# Patient Record
Sex: MALE | Race: BLACK OR AFRICAN AMERICAN | NOT HISPANIC OR LATINO | URBAN - METROPOLITAN AREA
[De-identification: names, ages, dates, MRNs, and addresses within clinical notes are randomized per-mention and may not be internally consistent; named-entity substitution may affect disease eponyms.]

---

## 2018-03-01 ENCOUNTER — OFFICE VISIT CONVERTED (OUTPATIENT)
Dept: FAMILY MEDICINE CLINIC | Facility: CLINIC | Age: 63
End: 2018-03-01
Attending: FAMILY MEDICINE

## 2018-05-24 ENCOUNTER — CONVERSION ENCOUNTER (OUTPATIENT)
Dept: FAMILY MEDICINE CLINIC | Facility: CLINIC | Age: 63
End: 2018-05-24

## 2018-05-24 ENCOUNTER — OFFICE VISIT CONVERTED (OUTPATIENT)
Dept: FAMILY MEDICINE CLINIC | Facility: CLINIC | Age: 63
End: 2018-05-24
Attending: NURSE PRACTITIONER

## 2018-05-29 ENCOUNTER — CONVERSION ENCOUNTER (OUTPATIENT)
Dept: UROLOGY | Facility: CLINIC | Age: 63
End: 2018-05-29

## 2018-05-29 ENCOUNTER — OFFICE VISIT CONVERTED (OUTPATIENT)
Dept: UROLOGY | Facility: CLINIC | Age: 63
End: 2018-05-29
Attending: NURSE PRACTITIONER

## 2018-06-28 ENCOUNTER — PROCEDURE VISIT CONVERTED (OUTPATIENT)
Dept: UROLOGY | Facility: CLINIC | Age: 63
End: 2018-06-28
Attending: UROLOGY

## 2018-08-01 ENCOUNTER — OFFICE VISIT CONVERTED (OUTPATIENT)
Dept: UROLOGY | Facility: CLINIC | Age: 63
End: 2018-08-01
Attending: NURSE PRACTITIONER

## 2018-08-20 ENCOUNTER — OFFICE VISIT CONVERTED (OUTPATIENT)
Dept: FAMILY MEDICINE CLINIC | Facility: CLINIC | Age: 63
End: 2018-08-20
Attending: NURSE PRACTITIONER

## 2018-09-06 ENCOUNTER — CONVERSION ENCOUNTER (OUTPATIENT)
Dept: UROLOGY | Facility: CLINIC | Age: 63
End: 2018-09-06

## 2018-09-06 ENCOUNTER — OFFICE VISIT CONVERTED (OUTPATIENT)
Dept: UROLOGY | Facility: CLINIC | Age: 63
End: 2018-09-06
Attending: UROLOGY

## 2018-12-18 ENCOUNTER — OFFICE VISIT CONVERTED (OUTPATIENT)
Dept: FAMILY MEDICINE CLINIC | Facility: CLINIC | Age: 63
End: 2018-12-18
Attending: NURSE PRACTITIONER

## 2019-02-04 ENCOUNTER — HOSPITAL ENCOUNTER (OUTPATIENT)
Dept: FAMILY MEDICINE CLINIC | Facility: CLINIC | Age: 64
Discharge: HOME | End: 2019-02-04
Attending: NURSE PRACTITIONER

## 2019-02-04 ENCOUNTER — OFFICE VISIT CONVERTED (OUTPATIENT)
Dept: FAMILY MEDICINE CLINIC | Facility: CLINIC | Age: 64
End: 2019-02-04
Attending: NURSE PRACTITIONER

## 2019-02-04 LAB
25(OH)D3 SERPL-MCNC: 16.6 NG/ML (ref 30–100)
ALBUMIN SERPL-MCNC: 4.3 G/DL (ref 3.5–5)
ALBUMIN/GLOB SERPL: 1.3 {RATIO} (ref 1.4–2.6)
ALP SERPL-CCNC: 83 U/L (ref 56–155)
ALT SERPL-CCNC: 10 U/L (ref 10–40)
ANION GAP SERPL CALC-SCNC: 19 MMOL/L (ref 8–19)
AST SERPL-CCNC: 17 U/L (ref 15–50)
BASOPHILS # BLD AUTO: 0 10*3/UL (ref 0–0.2)
BASOPHILS NFR BLD AUTO: 0.1 % (ref 0–3)
BILIRUB SERPL-MCNC: 0.56 MG/DL (ref 0.2–1.3)
BUN SERPL-MCNC: 14 MG/DL (ref 5–25)
BUN/CREAT SERPL: 12 {RATIO} (ref 6–20)
CALCIUM SERPL-MCNC: 9.2 MG/DL (ref 8.7–10.4)
CHLORIDE SERPL-SCNC: 98 MMOL/L (ref 99–111)
CHOLEST SERPL-MCNC: 173 MG/DL (ref 107–200)
CHOLEST/HDLC SERPL: 4.2 {RATIO} (ref 3–6)
CONV CO2: 27 MMOL/L (ref 22–32)
CONV CREATININE URINE, RANDOM: 65.8 MG/DL (ref 10–300)
CONV MICROALBUM.,U,RANDOM: <12 MG/L (ref 0–20)
CONV TOTAL PROTEIN: 7.5 G/DL (ref 6.3–8.2)
CREAT UR-MCNC: 1.18 MG/DL (ref 0.7–1.2)
EOSINOPHIL # BLD AUTO: 0.14 10*3/UL (ref 0–0.7)
EOSINOPHIL # BLD AUTO: 3.93 % (ref 0–7)
ERYTHROCYTE [DISTWIDTH] IN BLOOD BY AUTOMATED COUNT: 13.4 % (ref 11.5–14.5)
EST. AVERAGE GLUCOSE BLD GHB EST-MCNC: 128 MG/DL
FERRITIN SERPL-MCNC: 197 NG/ML (ref 30–300)
FOLATE SERPL-MCNC: 6.9 NG/ML (ref 4.8–20)
GFR SERPLBLD BASED ON 1.73 SQ M-ARVRAT: >60 ML/MIN/{1.73_M2}
GLOBULIN UR ELPH-MCNC: 3.2 G/DL (ref 2–3.5)
GLUCOSE SERPL-MCNC: 97 MG/DL (ref 70–99)
HBA1C MFR BLD: 13.6 G/DL (ref 14–18)
HBA1C MFR BLD: 6.1 % (ref 3.5–5.7)
HCT VFR BLD AUTO: 37.7 % (ref 42–52)
HDLC SERPL-MCNC: 41 MG/DL (ref 40–60)
IRON SATN MFR SERPL: 23 % (ref 20–55)
IRON SERPL-MCNC: 77 UG/DL (ref 70–180)
LDLC SERPL CALC-MCNC: 115 MG/DL (ref 70–100)
LYMPHOCYTES # BLD AUTO: 1.25 10*3/UL (ref 1–5)
MCH RBC QN AUTO: 30.1 PG (ref 27–31)
MCHC RBC AUTO-ENTMCNC: 36.1 G/DL (ref 33–37)
MCV RBC AUTO: 83.5 FL (ref 80–96)
MICROALBUMIN/CREAT UR: 18.2 MG/G{CRE} (ref 0–25)
MONOCYTES # BLD AUTO: 0.23 10*3/UL (ref 0.2–1.2)
MONOCYTES NFR BLD AUTO: 6.39 % (ref 3–10)
NEUTROPHILS # BLD AUTO: 1.96 10*3/UL (ref 2–8)
NEUTROPHILS NFR BLD AUTO: 54.7 % (ref 30–85)
NRBC BLD AUTO-RTO: 0 % (ref 0–0.01)
OSMOLALITY SERPL CALC.SUM OF ELEC: 292 MOSM/KG (ref 273–304)
PLATELET # BLD AUTO: 215 10*3/UL (ref 130–400)
PMV BLD AUTO: 9.5 FL (ref 7.4–10.4)
POTASSIUM SERPL-SCNC: 2.8 MMOL/L (ref 3.5–5.3)
RBC # BLD AUTO: 4.52 10*6/UL (ref 4.7–6.1)
SODIUM SERPL-SCNC: 141 MMOL/L (ref 135–147)
T4 FREE SERPL-MCNC: 1.7 NG/DL (ref 0.9–1.8)
TIBC SERPL-MCNC: 336 UG/DL (ref 245–450)
TRANSFERRIN SERPL-MCNC: 235 MG/DL (ref 215–365)
TRIGL SERPL-MCNC: 83 MG/DL (ref 40–150)
TSH SERPL-ACNC: 0.56 M[IU]/L (ref 0.27–4.2)
URATE SERPL-MCNC: 6.4 MG/DL (ref 3.5–8.5)
VARIANT LYMPHS NFR BLD MANUAL: 34.9 % (ref 20–45)
VIT B12 SERPL-MCNC: 465 PG/ML (ref 211–911)
VLDLC SERPL-MCNC: 17 MG/DL (ref 5–37)
WBC # BLD AUTO: 3.57 10*3/UL (ref 4.8–10.8)

## 2019-02-11 ENCOUNTER — HOSPITAL ENCOUNTER (OUTPATIENT)
Dept: FAMILY MEDICINE CLINIC | Facility: CLINIC | Age: 64
Discharge: HOME | End: 2019-02-11
Attending: NURSE PRACTITIONER

## 2019-02-11 LAB
ALBUMIN SERPL-MCNC: 4.3 G/DL (ref 3.5–5)
ALBUMIN/GLOB SERPL: 1.3 {RATIO} (ref 1.4–2.6)
ALP SERPL-CCNC: 79 U/L (ref 56–155)
ALT SERPL-CCNC: 9 U/L (ref 10–40)
ANION GAP SERPL CALC-SCNC: 19 MMOL/L (ref 8–19)
AST SERPL-CCNC: 16 U/L (ref 15–50)
BILIRUB SERPL-MCNC: 0.38 MG/DL (ref 0.2–1.3)
BUN SERPL-MCNC: 15 MG/DL (ref 5–25)
BUN/CREAT SERPL: 12 {RATIO} (ref 6–20)
CALCIUM SERPL-MCNC: 9.5 MG/DL (ref 8.7–10.4)
CHLORIDE SERPL-SCNC: 105 MMOL/L (ref 99–111)
CONV CO2: 27 MMOL/L (ref 22–32)
CONV TOTAL PROTEIN: 7.6 G/DL (ref 6.3–8.2)
CREAT UR-MCNC: 1.22 MG/DL (ref 0.7–1.2)
GFR SERPLBLD BASED ON 1.73 SQ M-ARVRAT: >60 ML/MIN/{1.73_M2}
GLOBULIN UR ELPH-MCNC: 3.3 G/DL (ref 2–3.5)
GLUCOSE SERPL-MCNC: 111 MG/DL (ref 70–99)
OSMOLALITY SERPL CALC.SUM OF ELEC: 306 MOSM/KG (ref 273–304)
POTASSIUM SERPL-SCNC: 4 MMOL/L (ref 3.5–5.3)
SODIUM SERPL-SCNC: 147 MMOL/L (ref 135–147)

## 2019-02-12 ENCOUNTER — HOSPITAL ENCOUNTER (OUTPATIENT)
Dept: UROLOGY | Facility: CLINIC | Age: 64
Discharge: HOME | End: 2019-02-12
Attending: UROLOGY

## 2019-02-12 ENCOUNTER — OFFICE VISIT CONVERTED (OUTPATIENT)
Dept: UROLOGY | Facility: CLINIC | Age: 64
End: 2019-02-12
Attending: UROLOGY

## 2019-02-12 ENCOUNTER — CONVERSION ENCOUNTER (OUTPATIENT)
Dept: UROLOGY | Facility: CLINIC | Age: 64
End: 2019-02-12

## 2019-02-12 LAB — PSA SERPL-MCNC: 1.56 NG/ML (ref 0–4)

## 2019-02-25 ENCOUNTER — OFFICE VISIT CONVERTED (OUTPATIENT)
Dept: FAMILY MEDICINE CLINIC | Facility: CLINIC | Age: 64
End: 2019-02-25
Attending: NURSE PRACTITIONER

## 2019-03-13 ENCOUNTER — OFFICE VISIT CONVERTED (OUTPATIENT)
Dept: FAMILY MEDICINE CLINIC | Facility: CLINIC | Age: 64
End: 2019-03-13
Attending: NURSE PRACTITIONER

## 2019-03-26 ENCOUNTER — OFFICE VISIT CONVERTED (OUTPATIENT)
Dept: FAMILY MEDICINE CLINIC | Facility: CLINIC | Age: 64
End: 2019-03-26
Attending: NURSE PRACTITIONER

## 2019-04-01 ENCOUNTER — HOSPITAL ENCOUNTER (OUTPATIENT)
Dept: OTHER | Facility: HOSPITAL | Age: 64
Discharge: HOME | End: 2019-04-01
Attending: NURSE PRACTITIONER

## 2019-12-18 ENCOUNTER — OFFICE VISIT CONVERTED (OUTPATIENT)
Dept: NEUROSURGERY | Facility: CLINIC | Age: 64
End: 2019-12-18
Attending: NEUROLOGICAL SURGERY

## 2020-04-02 ENCOUNTER — TELEMEDICINE CONVERTED (OUTPATIENT)
Dept: FAMILY MEDICINE CLINIC | Facility: CLINIC | Age: 65
End: 2020-04-02
Attending: NURSE PRACTITIONER

## 2021-05-07 NOTE — PROGRESS NOTES
"   Progress Note      Patient Name: Rambo Koehler   Patient ID: [id number]   Sex: Male   YOB: 1955        Visit Date: December 18, 2018    Provider: JOSELINE Montemayor   Location: [facility]   Location Address: [address] [place] [address]  [id number]   Location Phone: [phone]          Chief Complaint     he hurt 2 toes on his left foot, the toes are swollen and not healing, happened about 1 week ago       History Of Present Illness  Rambo Koehler is a 63 year old /Black male who presents for evaluation and treatment of:      injury to the 4th and 5th left toes x 1 week ago - he stubbed his toes on a chair in the floor - pt states it is turning dark - admits to pain when \"he can feel it\" he goes to Dr. Hawthorne for his diabetic neuropathy - bruising, swelling and redness       Past Medical History  Disease Name Date Onset Notes   Diet-controlled diabetes mellitus --  --    GERD (gastroesophageal reflux disease) --  --    Hypertension --  --    Hypothyroidism --  --    Neuropathy, peripheral --  --    Prostate Disorder --  --          Past Surgical History  Procedure Name Date Notes   Cataract surgery --  --          Medication List  Name Date Started Instructions   Accu-Chek Nedra Plus Meter miscellaneous misc 08/20/2018 use as directed for 90 days check daily   Accu-Chek Nedra Plus test strp miscellaneous strip 08/20/2018 use as directed check glucose daily   Accu-Chek Softclix Lancets miscellaneous misc 08/20/2018 use as directed check glucose daily   amlodipine 2.5 mg oral tablet 12/11/2018 TAKE ONE TABLET BY MOUTH DAILY   atenolol 25 mg oral tablet 11/05/2018 TAKE ONE TABLET BY MOUTH TWICE A DAY FOR HIGH BLOOD PRESSURE   baclofen 10 mg oral tablet 12/14/2018 TAKE ONE TABLET BY MOUTH DAILY AS NEEDED FOR MUSCLE SPASMS   Daily Multi-Vitamins/Iron oral tablet  take 1 tablet by oral route daily   ferrous sulfate 325 mg (65 mg iron) oral tablet  take 1 tablet " "(325 mg) by oral route once daily   folic acid 1 mg oral tablet 05/24/2018 take 1 tablet (1 mg) by oral route once daily   furosemide 20 mg oral tablet 12/11/2018 TAKE ONE TABLET BY MOUTH DAILY   gabapentin 800 mg oral tablet  take 1 tablet (800 mg) by oral route 3 times per day   hydrochlorothiazide 25 mg oral tablet 05/24/2018 TAKE ONE TABLET BY MOUTH DAILY FOR 30 DAYS   levothyroxine 175 mcg oral tablet 11/15/2018 TAKE ONE TABLET BY MOUTH DAILY   omeprazole 20 mg oral capsule,delayed release(DR/EC) 12/14/2018 TAKE ONE CAPSULE BY MOUTH DAILY 30 MINUTES TO 1 HOUR BEFORE A MEAL   tamsulosin 0.4 mg oral capsule,extended release 24hr 05/24/2018 take 1 capsule (0.4 mg) by oral route once daily 1/2 hour following the same meal each day         Allergy List  Allergen Name Date Reaction Notes   PENICILLINS --  --  --          Social History  Finding Status Start/Stop Quantity Notes   Tobacco Current every day --/-- 5-6 cigs a day --    Uses seatbelts --  --/-- --  yes         Review of Systems  · Cardiovascular  o Denies  o : chest pain, palpitations  · Respiratory  o Denies  o : shortness of breath  · Musculoskeletal  o * See HPI  · Endocrine  o Admits  o : diabetes      Vitals  Date Time BP Position Site L\R Cuff Size HR RR TEMP(F) WT  HT  BMI kg/m2 BSA m2 O2 Sat        12/18/2018 04:23 /70 Sitting    80 - R  97.6 254lbs 0oz 5'  11\" 35.43 2.4 98 %           Physical Examination  · Constitutional  o Appearance  o : well developed, well-nourished, in no acute distress  · Eyes  o Conjunctivae  o : conjunctivae normal  o Pupils and Irises  o : pupils equal and round, pupils reactive to light bilaterally  · Neck  o Inspection/Palpation  o : supple  o Thyroid  o : no thyromegaly  · Respiratory  o Respiratory Effort  o : breathing unlabored  o Auscultation of Lungs  o : clear to ascultation  · Cardiovascular  o Heart  o :   § Auscultation of Heart  § : regular rate and rhythm  o Peripheral Vascular System  o : "   § Extremities  § : no edema  · Lymphatic  o Neck  o : no lymphadenopathy present  · Musculoskeletal  o General  o :   § General Musculoskeletal  § : Left 4th toe with medial bruising and a medial whitish laceration. Pt denies pain with palpation but states cannot feel due to neuropathy. Muscle tone, strength, and development grossly normal.  · Skin and Subcutaneous Tissue  o General Inspection  o : see MS exam  · Neurologic  o Gait and Station  o :   § Gait Screening  § : gait with slightly limp  · Psychiatric  o Mood and Affect  o : mood normal, affect appropriate              Assessment  · Toe injury, left, initial encounter     959.7/S99.922A      Plan  · Orders  o ACO-17: Screened for tobacco use AND received tobacco cessation intervention (4004F) - - 12/18/2018  o ACO-39: Current medications updated and reviewed () - - 12/18/2018  o Foot (Left) [facility] ([id number]) - 959.7/S99.922A - 12/18/2018   No acute findings  · Medications  o Bactrim -160 mg oral tablet   SIG: take 1 tablet by oral route every 12 hours for 10 days   DISP: (20) tablets with 0 refills  Prescribed on 12/18/2018     o Bactroban 2 % topical cream   SIG: apply a small amount to the affected area by topical route 3 times per day for 7 days   DISP: (1) 15 gm tube with 0 refills  Prescribed on 12/18/2018     · Instructions  o Take all medications as prescribed/directed.  o Patient was educated/instructed on their diagnosis, treatment and medications prior to discharge from the clinic today.  o Follow up with any signs of infection.   · Disposition  o Follow up as needed.            Electronically Signed by: JOSELINE Montemayor -Author on December 18, 2018 05:14:14 PM

## 2021-05-07 NOTE — PROGRESS NOTES
Quick Note      Patient Name: Rambo Koehler   Patient ID: [id number]   Sex: Male   YOB: 1955    Referring Provider: Zakiya TREVIZO    Visit Date: April 2, 2020    Provider: JOSELINE Cardenas   Location: [facility]   Location Address: [address] [place] [address]   [place]  [id number]   Location Phone: [phone]          History Of Present Illness  TELEHEALTH VISIT  Chief Complaint: update on the pt and possible refills   Rambo Koehler is a 64 year old /Black male who is presenting for evaluation via telephone telehealth visit. Verbal consent obtained before beginning visit.   Provider spent 16 minutes with the patient during telephone telehealth visit.   The following staff were present during this visit: myself and wife of pt --who is the POA   Past Medical History/Overview of Patient Symptoms     attempted to call at 445pm, 447pm, 450pm, 455pm;   then the wife called back     start: 457 pm   ended:513pm     pt was in the hospital in November 2019 with a stroke--1 week prior to thanksgiving--pt had a blood clot to the brain--then bled from the inside and they did surgery--and had 58 staples--and then had to do rehab then was sent home just prior to new years--and pt had not taken any meds in months and missed all kinds of PCP appoints and then even was in the ICU they used ice to cool him off-tp get the temp down--    then did some PT at home then quit--    now he's walking some--uses a cane    she also reports he does not eat only lays around and drinks--and then walks some-and constantly wants to know what happened to his head-    then one time she came home and he'd turned on the stove (gas) to light a cigarette and left it on and she found it like that once home form work--and scared her terribly--the children stay next door while she is at work--but she reports his memory is getting worse and worse--and just unsure if she's going to have to  place him in a nsg home--she just doesn't know what to do--and he needs refills on everything he was DC to home on-as well--    ever since then had memory issues--and is drinking ETOH again --even though he knows not to--and memory getting worse     pt was on the meds they DC him home to but since ran out- none--and wife reports she's very concerned--and she reports his memory is getting far worse--    ___________________________________________________________________________________________    pt's last appoint her was last year 3/13/2019    and last labs here was in 02/2019--    pt with known alcoholism Hx                Assessment  · Alcohol abuse     305.00/F10.10  · Essential hypertension     401.9/I10  · Hyperlipidemia     272.4/E78.5  · Hypothyroidism     244.9/E03.9  · Memory changes     780.93/R41.3  · History of stroke     V12.54/Z86.73  · History of recent intracranial surgery     V45.89/Z98.890      Plan  · Orders  o NEUROLOGY CONSULTATION. (NEURO) - 780.93/R41.3, V12.54/Z86.73, V45.89/Z98.890 - 04/02/2020   pt suffered intracranial bleed and stroke in Nov 2019 and then DC from [facility] Dec 2019 and we just had first telehealth visit today and wife reports worsening memory changes   · Medications  o hydralazine 50 mg oral tablet   SIG: take 1 tablet by oral route 3 times a day for 90 days   DISP: (270) tablets with 0 refills  Adjusted on 04/02/2020     o carvedilol 12.5 mg oral tablet   SIG: take 1 tablet (12.5 mg) by oral route 2 times per day with food for 90 days   DISP: (180) tablets with 0 refills  Adjusted on 04/02/2020     o pantoprazole 40 mg oral tablet,delayed release (DR/EC)   SIG: take 1 tablet by oral route once a day (at bedtime) for 90 days   DISP: (90) tablets with 0 refills  Adjusted on 04/02/2020     o amlodipine 10 mg oral tablet   SIG: take 1 tablet (10 mg) by oral route once daily for 90 days   DISP: (90) tablets with 0 refills  Adjusted on 04/02/2020     o atenolol 25 mg oral tablet    SIG: TAKE ONE TABLET BY MOUTH TWICE A DAY FOR HIGH BLOOD PRESSURE   DISP: (180) Tablet with 1 refills  Discontinued on 04/02/2020     o atorvastatin 10 mg oral tablet   SIG: TAKE ONE TABLET BY MOUTH EVERY NIGHT AT BEDTIME   DISP: (30) Tablet with 1 refills  Discontinued on 04/02/2020     o ferrous sulfate 325 mg (65 mg iron) oral tablet   SIG: take 1 tablet (325 mg) by oral route once daily   DISP: (0) tablet with 0 refills  Discontinued on 04/02/2020     o folic acid 1 mg oral tablet   SIG: take 1 tablet (1 mg) by oral route once daily   DISP: (90) tablet with 1 refills  Discontinued on 04/02/2020     o gabapentin 800 mg oral tablet   SIG: take 1 tablet (800 mg) by oral route 3 times per day   DISP: (0) tablet with 0 refills  Discontinued on 04/02/2020     o Medications have been Reconciled  o Transition of Care or Provider Policy  · Instructions  o Counseled patient on harms of alcohol misuse and encouraged cessation of alcohol intake --wife is well aware of this--but pt very non-compliant   o Patient advised to monitor blood pressure (B/P) at home and journal readings. Patient informed that a B/P reading at home of more than 130/80 is considered hypertension. For readings greater kvsc237/90 or higher patient is advised to follow up in the office with readings for management. Patient advised to limit sodium intake.  o Recommended exercise program to assist with cholesterol, weight loss and overall health improvement.  o Advised that cheeses and other sources of dairy fats, animal fats, fast food, and the extras (candy, pastries, pies, doughnuts and cookies) all contain LDL raising nutrients. Advised to increase fruits, vegetables, whole grains, and to monitor portion sizes.   o Plan Of Care: did refills based on the hospital DC summary in Dec 2019   o Chronic conditions reviewed and taken into consideration for today's treatment plan.  o Patient instructed to seek medical attention urgently for new or worsening  symptoms.  o Patient was educated/instructed on their diagnosis, treatment and medications prior to discharge from the telehealth visit today.  o Take all medications as prescribed/directed.  o Call the office with any concerns or questions.  o Risks, benefits, and alternatives were discussed with the patient. The patient is aware of risks associated with: noncompliance with meds; and then the risks of the fact he is drinking ETOH again   · Disposition  o Call or Return if symptoms worsen or persist.  o Return Visit Request in/on 2 months +/- 2 days (4229).     reassured the wife of the pt I would review the DC summary of the hospitalization and then refill the basic meds he's always been on based on these summaries--    and then will only do the 1 refill for 90 day and that's it--then will need to F/U in the office prior to running out and have appointment with me and then we will also obtain labs as well    also regarding the memory issues--most likely residual from the prior stroke but now worsened from non-compliance with his BP meds and the alcoholism--       _____I READ OVER ALL THE HOSPITAL NOTES AFTER THE CALL TODAY_______________________    BASED ON THE DISCHARGE SUMMARY I ONLY REFILLED WHAT PT WAS DISCHARGED ON-I DID NOT REFILL THE THYROID MED-AS THEY DID NOT HAVE HIM ON THIS AND I HAVE NO LABS WITHINT E LAST 14 MONTHS FOR THIS--             Electronically Signed by: JOSELINE Cardenas -Author on April 3, 2020 03:33:52 PM

## 2021-05-07 NOTE — PROGRESS NOTES
Progress Note      Patient Name: Rambo Koehler   Patient ID: [id number]   Sex: Male   YOB: 1955    Referring Provider: Srinivas Polo MD    Visit Date: March 1, 2018    Provider: Srinivas Polo MD   Location: [facility]   Location Address: [address] [place] [address]  [id number]   Location Phone: [phone]          Chief Complaint     hospital follow up  bilateral lower legs and feet swelling. started 7 days ago.    pt was discharge from hospital 2 weeks ago- had been treated for pneumonia- pt has been off alcohol since beginning of February- pt refuses AA and counseling           History Of Present Illness  Rambo Koehler is a 62 year old /Black male who presents for evaluation and treatment of:      pt has had bilateral lower extremity swelling x a couple weeks  pt cold symptoms x 1 week ago- sudden onset- worsening symptoms       Past Medical History  Disease Name Date Onset Notes   Diabetes --  --    GERD (gastroesophageal reflux disease) --  --    Hypertension --  --    Hypothyroidism --  --    Neuropathy, peripheral --  --    Prostate Disorder --  --          Past Surgical History  Procedure Name Date Notes   Cataract surgery --  --          Medication List  Name Date Started Instructions   Accu-Chek Nedra Plus test strp miscellaneous strip  use as directed   Accu-Chek Softclix Lancets miscellaneous misc  use as directed   amlodipine 2.5 mg oral tablet  take 1 tablet (2.5 mg) by oral route once daily   baclofen 10 mg oral tablet 11/06/2017 TAKE ONE TABLET BY MOUTH TWICE A DAY AS NEEDED   Daily Multi-Vitamins/Iron oral tablet  take 1 tablet by oral route daily   ferrous sulfate 325 mg (65 mg iron) oral tablet  take 1 tablet (325 mg) by oral route once daily   folic acid 1 mg oral tablet  take 1 tablet (1 mg) by oral route once daily   gabapentin 800 mg oral tablet  take 1 tablet (800 mg) by oral route 3 times per day   hydrochlorothiazide 12.5 mg  "oral tablet  take 1 tablet (12.5 mg) by oral route once daily   levothyroxine 175 mcg oral tablet  take 1 tablet (175 mcg) by oral route once daily   omeprazole 20 mg oral capsule,delayed release(DR/EC)  take 1 capsule (20 mg) by oral route once daily before a meal   tamsulosin 0.4 mg oral capsule,extended release 24hr 12/22/2017 TAKE ONE CAPSULE BY MOUTH DAILY         Allergy List  Allergen Name Date Reaction Notes   PENICILLINS --  --  --          Social History  Finding Status Start/Stop Quantity Notes   Tobacco Former --/-- --  --    Uses seatbelts --  --/-- --  yes         Review of Systems  · Constitutional  o Denies  o : fatigue, fever  · Cardiovascular  o Denies  o : chest pain, palpitations  · Respiratory  o Admits  o : shortness of breath, cough  · Gastrointestinal  o Denies  o : nausea, vomiting, diarrhea      Vitals  Date Time BP Position Site L\R Cuff Size HR RR TEMP(F) WT  HT  BMI kg/m2 BSA m2 O2 Sat        03/01/2018 04:30 /90 Sitting    83 - R  97.1 236lbs 6oz 5'  11\" 32.97 2.32 98 %           Physical Examination  · Constitutional  o Appearance  o : well developed, well-nourished, in no acute distress  · Head and Face  o HEENT  o : erythema of throat, uvula midline, throat open, no abscesses seen  · Neck  o Inspection/Palpation  o : supple  o Thyroid  o : no thyromegaly  · Respiratory  o Respiratory Effort  o : breathing unlabored  o Auscultation of Lungs  o : clear to ascultation  · Cardiovascular  o Heart  o :   § Auscultation of Heart  § : regular rate and rhythm  o Peripheral Vascular System  o :   § Extremities  § : no edema  · Gastrointestinal  o Abdominal Examination  o :   § Abdomen  § : active bowel sounds, no hepatosplenomegaly, nontender, no masses palpated  · Musculoskeletal  o General  o :   § General Musculoskeletal  § : No joint swelling or deformity., Muscle tone, strength, and development grossly normal.  · Skin and Subcutaneous Tissue  o General Inspection  o : no lesions " present, no areas of discoloration, skin turgor normal, texture normal  · Neurologic  o Gait and Station  o :   § Gait Screening  § : normal gait  · Psychiatric  o Mood and Affect  o : mood normal, affect appropriate          Assessment  · Cough     786.2/R05  · Essential hypertension     401.9/I10  · Pneumonia     486/J18.9  · Edema     782.3/R60.9  · Shortness of breath     786.05/R06.02      Plan  · Orders  o CBC with Auto Diff [facility] ([id number]) - 786.2/R05, 782.3/R60.9 - 03/01/2018  o CMP [facility] ([id number]) - 786.2/R05, 782.3/R60.9 - 03/01/2018  o ACO-39: Current medications updated and reviewed () - - 03/01/2018  o Influenza immunization was not ordered or administered for reasons documented by clinician () - - 03/01/2018  o US venous duplex scan extremity lower bilateral ([id number]) - 782.3/R60.9 - 03/01/2018  o BNP (brain natriuretic peptide measurement) (20739) - 786.2/R05, 782.3/R60.9, 786.05/R06.02 - 03/01/2018  o Xray chest 2 views [facility] Preferred View ([id number]) - 786.2/R05 - 03/01/2018  · Medications  o Levaquin 500 mg oral tablet   SIG: take 1 tablet (500 mg) by oral route once daily for 7 days   DISP: (7) tablets with 0 refills  Prescribed on 03/01/2018     o Probiotic 20 billion cell oral capsule   SIG: take 1 capsule by oral route 2 times a day for 10 days   DISP: (20) capsules with 0 refills  Prescribed on 03/01/2018     o hydrochlorothiazide 25 mg oral tablet   SIG: take 1 tablet (25 mg) by oral route once daily for 30 days   DISP: (30) tablets with 0 refills  Prescribed on 03/01/2018     · Instructions  o Patient was educated/instructed on their diagnosis, treatment and medications prior to discharge from the clinic today.            Electronically Signed by: Srinivas Polo MD -Author on March 8, 2018 02:29:38 PM

## 2021-05-07 NOTE — PROGRESS NOTES
Progress Note      Patient Name: Rambo Koehler   Patient ID: [id number]   Sex: Male   YOB: 1955        Visit Date: March 26, 2019    Provider: JOSELINE Montemayor   Location: [facility]   Location Address: [address] [place] [address]  [id number]   Location Phone: [phone]          Chief Complaint     numbness bilaterally from knees on down to feet, woke this morning with it, he called EMS and then felt better so did not go to hospital       History Of Present Illness  Rambo Koehler is a 63 year old /Black male who presents for evaluation and treatment of:      bilateral numbness of lower extremities from the knees down, states different from neuropathy with tingling - symptoms were noted this morning when he woke up - pt states he tried to get up and walk but was unable because his feet were numb - he called EMS and evaluated him and checked his strength of his hands and for symptoms of a stroke, he declined to go to ER - continues to have some numbness of the right lower leg - denies sores of the feet     Pt has an appointment with Podiatry tomorrow    Pt has hx of diabetes and arthritis - hasn't checked his glucose    right ear continues to feel stopped up       Past Medical History  Disease Name Date Onset Notes   Bunion --  --    Diet-controlled diabetes mellitus --  --    GERD (gastroesophageal reflux disease) --  --    Hammertoe --  --    Hypertension --  --    Hypothyroidism --  --    Neuropathy, peripheral --  --    Prostate Disorder --  --          Past Surgical History  Procedure Name Date Notes   Cataract surgery --  --          Medication List  Name Date Started Instructions   Accu-Chek Nedra Plus Meter miscellaneous misc 08/20/2018 use as directed for 90 days check daily   Accu-Chek Nedra Plus test strp miscellaneous strip 08/20/2018 use as directed check glucose daily   Accu-Chek Softclix Lancets miscellaneous misc 08/20/2018 use as  directed check glucose daily   amlodipine 2.5 mg oral tablet 02/04/2019 TAKE ONE TABLET BY MOUTH DAILY   atenolol 25 mg oral tablet 02/04/2019 TAKE ONE TABLET BY MOUTH TWICE A DAY FOR HIGH BLOOD PRESSURE   baclofen 10 mg oral tablet 02/04/2019 TAKE ONE TABLET BY MOUTH DAILY AS NEEDED FOR MUSCLE SPASMS   Daily Multi-Vitamins/Iron oral tablet 02/04/2019 take 1 tablet by oral route daily   ferrous sulfate 325 mg (65 mg iron) oral tablet  take 1 tablet (325 mg) by oral route once daily   folic acid 1 mg oral tablet 02/04/2019 take 1 tablet (1 mg) by oral route once daily   furosemide 20 mg oral tablet 02/04/2019 TAKE ONE TABLET BY MOUTH DAILY   gabapentin 800 mg oral tablet  take 1 tablet (800 mg) by oral route 3 times per day   levothyroxine 175 mcg oral tablet 02/04/2019 TAKE ONE TABLET BY MOUTH DAILY   ofloxacin 0.3 % otic (ear) drops 03/13/2019 instill 10 drops (1.5 mg) into right ear by otic route 2 times per day for 10 days   omeprazole 20 mg oral capsule,delayed release(DR/EC) 02/04/2019 TAKE ONE CAPSULE BY MOUTH DAILY 30 MINUTES TO 1 HOUR BEFORE A MEAL   potassium chloride 10 mEq oral tablet extended release 02/12/2019 take 1 tablet (10 meq) by oral route once daily with food and with daily Lasix   Singulair 10 mg oral tablet 03/24/2019 TAKE ONE TABLET BY MOUTH EVERY EVENING FOR 14 DAYS   tamsulosin 0.4 mg oral capsule 02/04/2019 take 1 capsule (0.4 mg) by oral route once daily 1/2 hour following the same meal each day   Vitamin D2 50,000 unit oral capsule 02/05/2019 take 1 capsule (50,000 unit) by oral route once weekly for 90 days         Allergy List  Allergen Name Date Reaction Notes   PENICILLINS --  --  --          Social History  Finding Status Start/Stop Quantity Notes   Tobacco Current every day --/-- 5-6 cigs a day --    Uses seatbelts --  --/-- --  yes         Immunizations  NameDate Admin Mfg Trade Name Lot Number Route Inj VIS Given VIS Publication   [id number]/04/2019 [facility] FLUZONE-HIGH DOSE [id number]  "  02/04/2019 08/07/2015   Comments: NDC [id number]         Review of Systems  · Constitutional  o Denies  o : fever, headache, chills, body aches  · Cardiovascular  o Denies  o : chest pain, palpitations  · Respiratory  o Denies  o : shortness of breath, wheezing, cough  · Gastrointestinal  o Denies  o : nausea, vomiting, diarrhea  · Neurologic  o Admits  o : difficulty getting speech out when legs were numb  o Denies  o : muscular weakness, difficulty concentrating, memory difficulties      Vitals  Date Time BP Position Site L\R Cuff Size HR RR TEMP (F) WT  HT  BMI kg/m2 BSA m2 O2 Sat HC       03/26/2019 11:22 /72 Sitting    67 - R  97.2 250lbs 0oz 5'  11\" 34.87 2.38 96 %          Physical Examination  · Constitutional  o Appearance  o : well developed, well-nourished, in no acute distress  · Eyes  o Conjunctivae  o : conjunctivae normal  o Pupils and Irises  o : pupils equal and round, pupils reactive to light bilaterally  · Neck  o Inspection/Palpation  o : supple  o Thyroid  o : no thyromegaly  · Respiratory  o Respiratory Effort  o : breathing unlabored  o Auscultation of Lungs  o : clear to ascultation  · Cardiovascular  o Heart  o :   § Auscultation of Heart  § : regular rate and rhythm  o Peripheral Vascular System  o :   § Extremities  § : no edema  · Lymphatic  o Neck  o : no lymphadenopathy present  · Musculoskeletal  o General  o :   § General Musculoskeletal  § : No joint swelling or deformity., Muscle tone, strength, and development grossly normal.  · Skin and Subcutaneous Tissue  o General Inspection  o : no lesions present, no areas of discoloration, skin turgor normal, texture normal  · Neurologic  o Gait and Station  o :   § Gait Screening  § : normal gait  · Psychiatric  o Mood and Affect  o : mood normal, affect appropriate  · Left DM Foot Exam  o Sensation  o : no sensory perceptible to 10-gram nylon monofilament exam (5/5)  o Visual Inspection  o : visual inspection is normal with no " signs of breakdown, ulcerations or deformities unless otherwise noted.   o Vascular  o : palpable dorsalis pedis and posterir tibialis pulses present, normal capillary refill  · Right DM Foot Exam  o Sensation  o : no sensory exam perceptible to 10-gram nylon monofilament exam (5/5)  o Visual Inspection  o : visual inspection is normal with no signs of breakdown, ulcerations or deformities unless otherwise noted.   o Vascular  o : palpable dorsalis pedis and posterir tibialis pulses present, normal capillary refill          Assessment  · Diabetes mellitus, type 2     250.00/E11.9  · Numbness     782.0/R20.0      Plan  · Orders  o IOP - Urinalysis without Microscopy (Clinitek) [facility] ([id number]) - 250.00/E11.9 - 03/26/2019  o Diabetic Foot (Motor and Sensory) Exam Completed [facility] (, , 2028F) - 250.00/E11.9 - 03/26/2019  o IOP - Urinalysis without Microscopy (Clinitek) [facility] ([id number]) - - 03/26/2019   GLU-NEG FLOYD-NEG KET-NEG SG-1.010 BLO-NEG pH-6.0 PRO-NEG URO-0.2 NIT-NEG JOANNA-NEG  o ACO-17: Screened for tobacco use AND received tobacco cessation intervention (4004F) - - 03/26/2019  o ACO-39: Current medications updated and reviewed () - - 03/26/2019  o CT Head/Brain without IV Contrast [facility] ([id number]) - 782.0/R20.0 - 03/26/2019  · Instructions  o Daily foot care. Avoid walking barefoot. Annual Dilated Eye Exam.  o Patient was educated/instructed on their diagnosis, treatment and medications prior to discharge from the clinic today.  · Disposition  o Follow up as needed.            Electronically Signed by: JOSELINE Montemayor -Author on March 26, 2019 01:01:31 PM

## 2021-05-07 NOTE — PROGRESS NOTES
Progress Note      Patient Name: Rambo Koehler   Patient ID: [id number]   Sex: Male   YOB: 1955        Visit Date: May 24, 2018    Provider: MARKY Cardenas   Location: [facility]   Location Address: [address] [place] [address]  [id number]   Location Phone: [phone]          Chief Complaint     PATIENT IS HERE FOR REFILL.   HE ALSO NEEDS A NEW METER FOR HIS BLOOD SUGAR, HIS BROKE ABOUT 2 WEEKS A GO.    HE ALSO WANTS A HEP A SHOT.   HE WENT TO [facility] AND THEY ARE HELPING HIM.      HE HAD A COLONOSCOPY LAST YEAR.           History Of Present Illness  Rambo Koehler is a 62 year old /Black male who presents for evaluation and treatment of:      pt her efor the refills and is not fasting for labs today--  and sees pain mgt now--for the baclofen and gabapentin     then pt also needs a new glucose meter--off all the diabetic meds now and is diet controlled     and pt also sees DR Connell and he does the prostate labs     had the cscope within the last yr in [facility]--all was good and good for 5 yrs --saw Dr Francis     and pt also sees Dr Hawthorne for podiatry and he checks the neuropathy and cut his nails       Past Medical History  Disease Name Date Onset Notes   Diabetes --  --    GERD (gastroesophageal reflux disease) --  --    Hypertension --  --    Hypothyroidism --  --    Neuropathy, peripheral --  --    Prostate Disorder --  --          Past Surgical History  Procedure Name Date Notes   Cataract surgery --  --          Medication List  Name Date Started Instructions   Accu-Chek Nedra Plus test strp miscellaneous strip  use as directed   Accu-Chek Softclix Lancets miscellaneous misc  use as directed   amlodipine 2.5 mg oral tablet  take 1 tablet (2.5 mg) by oral route once daily   baclofen 10 mg oral tablet 04/02/2018 TAKE ONE TABLET BY MOUTH TWICE A DAY AS NEEDED   Daily Multi-Vitamins/Iron oral tablet  take 1 tablet by oral route daily    ferrous sulfate 325 mg (65 mg iron) oral tablet  take 1 tablet (325 mg) by oral route once daily   folic acid 1 mg oral tablet  take 1 tablet (1 mg) by oral route once daily   gabapentin 800 mg oral tablet  take 1 tablet (800 mg) by oral route 3 times per day   hydrochlorothiazide 25 mg oral tablet 03/27/2018 TAKE ONE TABLET BY MOUTH DAILY FOR 30 DAYS   Levaquin 500 mg oral tablet 03/01/2018 take 1 tablet (500 mg) by oral route once daily for 7 days   levothyroxine 175 mcg oral tablet  take 1 tablet (175 mcg) by oral route once daily   omeprazole 20 mg oral capsule,delayed release(DR/EC) 03/10/2018 TAKE ONE CAPSULE BY MOUTH DAILY FOR GERD   Probiotic 20 billion cell oral capsule 03/01/2018 take 1 capsule by oral route 2 times a day for 10 days   tamsulosin 0.4 mg oral capsule,extended release 24hr 12/22/2017 TAKE ONE CAPSULE BY MOUTH DAILY         Allergy List  Allergen Name Date Reaction Notes   PENICILLINS --  --  --          Social History  Finding Status Start/Stop Quantity Notes   Tobacco Former --/-- --  --    Uses seatbelts --  --/-- --  yes         Review of Systems  · Constitutional  o Denies  o : fever  · HENT  o Denies  o : vertigo, recent head injury  · Cardiovascular  o Denies  o : chest pain, irregular heart beats  · Respiratory  o Denies  o : shortness of breath, productive cough  · Gastrointestinal  o Denies  o : nausea, vomiting  · Genitourinary  o Denies  o : dysuria, urinary retention  · Integument  o Denies  o : hair growth change, new skin lesions  · Neurologic  o Admits  o : tingling or numbness  o Denies  o : altered mental status, seizures  · Musculoskeletal  o Admits  o : shoulder pain, knee pain  o Denies  o : joint swelling, limitation of motion  · Endocrine  o Denies  o : cold intolerance, heat intolerance  · Psychiatric  o Denies  o : anxiety, depression, suicidal ideation, homicidal ideation  · Heme-Lymph  o Denies  o : petechiae, lymph node enlargement or  "tenderness  · Allergic-Immunologic  o Denies  o : frequent illnesses      Vitals  Date Time BP Position Site L\R Cuff Size HR RR TEMP(F) WT  HT  BMI kg/m2 BSA m2 O2 Sat        05/24/2018 11:09 /78 Sitting    83 - R  98 243lbs 8oz 5'  11\" 33.96 2.35 97 %           Physical Examination  · Constitutional  o Appearance  o : well developed, well-nourished, in no acute distress  · Head and Face  o HEENT  o : Unremarkable  · Eyes  o Conjunctivae  o : conjunctivae normal  · Neck  o Inspection/Palpation  o : supple  o Thyroid  o : no thyromegaly  · Respiratory  o Respiratory Effort  o : breathing unlabored  o Auscultation of Lungs  o : clear to ascultation  · Cardiovascular  o Heart  o :   § Auscultation of Heart  § : regular rate and rhythm  o Peripheral Vascular System  o :   § Carotid Arteries  § : normal pulses bilaterally, no bruits present  § Extremities  § : no edema  · Gastrointestinal  o Abdominal Examination  o :   § Abdomen  § : soft and nontender  · Lymphatic  o Neck  o : no lymphadenopathy present  · Musculoskeletal  o General  o :   § General Musculoskeletal  § : No joint swelling or deformity., Muscle tone, strength, and development grossly normal.--and right knee painful with any ROM and pops   · Skin and Subcutaneous Tissue  o General Inspection  o : skin turgor normal, texture normal  · Neurologic  o Gait and Station  o :   § Gait Screening  § : normal gait  · Psychiatric  o Mood and Affect  o : mood normal, affect appropriate              Assessment  · BPH (benign prostatic hyperplasia)     600.00/N40.0  · Essential hypertension     401.9/I10  · GERD (gastroesophageal reflux disease)     530.81/K21.9  · Hyperlipidemia     272.4/E78.5  · Hypothyroidism     244.9/E03.9  · Diet-controlled diabetes mellitus     250.00/E11.9  · Encounter for HCV screening test for low risk patient     V73.89/Z11.59      Plan  · Orders  o CBC with Auto Diff [facility] (97375) - 401.9/I10 - 05/24/2018  o CMP [facility] (15940) - " 401.9/I10 - 05/24/2018  o Lipid Panel [facility] (72814) - 272.4/E78.5 - 05/24/2018  o Thyroid Profile (THYII) - 244.9/E03.9 - 05/24/2018  o ACO-14: Influenza immunization administered or previously received () - - 05/24/2018  o ACO-18: Negative screen for clinical depression using a standardized tool () - - 05/24/2018  o ACO-39: Current medications updated and reviewed () - - 05/24/2018  o Hepatitis C antibody (40437) - V73.89/Z11.59 - 05/24/2018  · Medications  o Accu-Chek Nedra Plus Meter miscellaneous misc   SIG: use as directed to check glucose daily   DISP: (1) Box with 0 refills  Prescribed on 05/24/2018     o Accu-Chek Nedra Plus test strp miscellaneous strip   SIG: use as directed test glucose daily   DISP: (1) 50 ct box with 5 refills  Adjusted on 05/24/2018     o Accu-Chek Softclix Lancets miscellaneous misc   SIG: use as directed test glucose daily   DISP: (1) 100 ct box with 1 refills  Adjusted on 05/24/2018     o amlodipine 2.5 mg oral tablet   SIG: take 1 tablet (2.5 mg) by oral route once daily   DISP: (30) tablet with 5 refills  Adjusted on 05/24/2018     o baclofen 10 mg oral tablet   SIG: take 1 tablet by oral route QD PRN muscle spasms   DISP: (30) Tablet with 2 refills  Adjusted on 05/24/2018     o folic acid 1 mg oral tablet   SIG: take 1 tablet (1 mg) by oral route once daily   DISP: (30) tablet with 5 refills  Adjusted on 05/24/2018     o hydrochlorothiazide 25 mg oral tablet   SIG: TAKE ONE TABLET BY MOUTH DAILY FOR 30 DAYS   DISP: (30) Tablet with 5 refills  Adjusted on 05/24/2018     o levothyroxine 175 mcg oral tablet   SIG: take 1 tablet (175 mcg) by oral route once daily   DISP: (30) tablet with 5 refills  Adjusted on 05/24/2018     o omeprazole 20 mg oral capsule,delayed release(DR/EC)   SIG: take 1 capsule (20 mg) by oral route once daily 30 minutes to 1 hour before a meal   DISP: (30) Capsule with 5 refills  Adjusted on 05/24/2018     o tamsulosin 0.4 mg oral capsule,extended  release 24hr   SIG: take 1 capsule (0.4 mg) by oral route once daily 1/2 hour following the same meal each day   DISP: (30) Capsule with 5 refills  Adjusted on 05/24/2018     o Levaquin 500 mg oral tablet   SIG: take 1 tablet (500 mg) by oral route once daily for 7 days   DISP: (7) tablets with 0 refills  Discontinued on 05/24/2018     o Probiotic 20 billion cell oral capsule   SIG: take 1 capsule by oral route 2 times a day for 10 days   DISP: (20) capsules with 0 refills  Discontinued on 05/24/2018     · Instructions  o Patient advised to monitor blood pressure (B/P) at home and journal readings. Patient informed that a B/P reading at home of more than 135/85 is considered hypertension. For readings greater [id number]/90 or higher patient is advised to follow up in the office with readings for management. Patient advised to limit sodium intake.  o Maintain a healthy weight. Avoid tight fitting clothes. Avoid fried, fatty foods, tomato sauce, chocolate, mint, garlic, onion, alcohol. caffeine. Eat smaller meals, dont lie down after a meal, dont smoke. Elevate the head of your bed 6-9 inches.  o Recommended exercise program to assist with cholesterol, weight loss and overall health improvement.  o Advised that cheeses and other sources of dairy fats, animal fats, fast food, and the extras (candy, pasteries, pies, doughnuts and cookies) all contain LDL raising nutrients. Advised to increase fruits, vegetables, whole grains, and to monitor portion sizes.   o Take all medications as prescribed/directed.  o Patient was educated/instructed on their diagnosis, treatment and medications prior to discharge from the clinic today.  o Patient counseled to reduce calorie intake.  o Patient was instructed to exercise regularly.  o Patient instructed to seek medical attention urgently for new or worsening symptoms.  o Call the office with any concerns or questions.  o Chronic conditions reviewed and taken into consideration for  today's treatment plan.  · Disposition  o Call or Return if symptoms worsen or persist.  o Return Visit Request in/on 6 months +/- 2 days (2110).            Electronically Signed by: MARKY Cardenas -Author on May 24, 2018 12:04:22 PM

## 2021-05-07 NOTE — PROGRESS NOTES
Progress Note      Patient Name: Rambo Koehler   Patient ID: [id number]   Sex: Male   YOB: 1955        Visit Date: February 25, 2019    Provider: MARKY Cardenas   Location: [facility]   Location Address: [address] [place] [address]  [id number]   Location Phone: [phone]          Chief Complaint     right ear pain, started about 3am this morning       History Of Present Illness  Rambo Koehler is a 63 year old /Black male who presents for evaluation and treatment of:      right ear throbbing-started this at 3 am--cough a little and that side of the neck sore--     and needs the handidicap placard renewed for the OA--and neuropathy --pt in pain mgt for this and the neuropathy from the diabetes so bad in the feet  and then now can hardly open the medication bottles himself as well       Past Medical History  Disease Name Date Onset Notes   Bunion --  --    Diet-controlled diabetes mellitus --  --    GERD (gastroesophageal reflux disease) --  --    Hammertoe --  --    Hypertension --  --    Hypothyroidism --  --    Neuropathy, peripheral --  --    Prostate Disorder --  --          Past Surgical History  Procedure Name Date Notes   Cataract surgery --  --          Medication List  Name Date Started Instructions   Accu-Chek Nedra Plus Meter miscellaneous misc 08/20/2018 use as directed for 90 days check daily   Accu-Chek Nedra Plus test strp miscellaneous strip 08/20/2018 use as directed check glucose daily   Accu-Chek Softclix Lancets miscellaneous misc 08/20/2018 use as directed check glucose daily   amlodipine 2.5 mg oral tablet 02/04/2019 TAKE ONE TABLET BY MOUTH DAILY   atenolol 25 mg oral tablet 02/04/2019 TAKE ONE TABLET BY MOUTH TWICE A DAY FOR HIGH BLOOD PRESSURE   baclofen 10 mg oral tablet 02/04/2019 TAKE ONE TABLET BY MOUTH DAILY AS NEEDED FOR MUSCLE SPASMS   Daily Multi-Vitamins/Iron oral tablet 02/04/2019 take 1 tablet by oral route  daily   ferrous sulfate 325 mg (65 mg iron) oral tablet  take 1 tablet (325 mg) by oral route once daily   folic acid 1 mg oral tablet 02/04/2019 take 1 tablet (1 mg) by oral route once daily   furosemide 20 mg oral tablet 02/04/2019 TAKE ONE TABLET BY MOUTH DAILY   gabapentin 800 mg oral tablet  take 1 tablet (800 mg) by oral route 3 times per day   levothyroxine 175 mcg oral tablet 02/04/2019 TAKE ONE TABLET BY MOUTH DAILY   omeprazole 20 mg oral capsule,delayed release(DR/EC) 02/04/2019 TAKE ONE CAPSULE BY MOUTH DAILY 30 MINUTES TO 1 HOUR BEFORE A MEAL   potassium chloride 10 mEq oral tablet extended release 02/12/2019 take 1 tablet (10 meq) by oral route once daily with food and with daily Lasix   tamsulosin 0.4 mg oral capsule 02/04/2019 take 1 capsule (0.4 mg) by oral route once daily 1/2 hour following the same meal each day   Vitamin D2 50,000 unit oral capsule 02/05/2019 take 1 capsule (50,000 unit) by oral route once weekly for 90 days         Allergy List  Allergen Name Date Reaction Notes   PENICILLINS --  --  --          Social History  Finding Status Start/Stop Quantity Notes   Tobacco Current every day --/-- 5-6 cigs a day --    Uses seatbelts --  --/-- --  yes         Immunizations  NameDate Admin Mfg Trade Name Lot Number Route Inj VIS Given VIS Publication   [id number]/04/2019 [facility] Fluzone High-Dose [id number]  02/04/2019 08/07/2015   Comments: NDC [id number]         Review of Systems  · Constitutional  o Denies  o : fever, headache, chills, body aches  · HENT  o Admits  o : postnasal drip, ear pain  o Denies  o : nasal congestion  · Cardiovascular  o Denies  o : chest pain, palpitations (fast, fluttering, or skipping beats), swelling (feet, ankles, hands), shortness of breath while walking or lying flat  · Respiratory  o Denies  o : shortness of breath, asthma or wheezing, COPD  · Gastrointestinal  o Denies  o : ulcers, nausea or vomiting  · Neurologic  o Admits  o : tingling or  "numbness  o Denies  o : lightheaded or dizzy, stroke, headaches  · Musculoskeletal  o Admits  o : joint pain, shoulder pain, knee pain  o Denies  o : back pain  · Endocrine  o Denies  o : thyroid disease, diabetes, heat or cold intolerance, excessive thirst or urination      Vitals  Date Time BP Position Site L\R Cuff Size HR RR TEMP(F) WT  HT  BMI kg/m2 BSA m2 O2 Sat HC       02/25/2019 11:47 /72 Sitting    71 - R  97.2 247lbs 5oz 5'  11\" 34.49 2.37 96 %           Physical Examination  · Constitutional  o Appearance  o : well developed, well-nourished, in no acute distress  · Head and Face  o HEENT  o : Unremarkable--(+) right TM redness and swelling and tragus tenderness slightly   · Eyes  o Conjunctivae  o : conjunctivae normal  · Neck  o Inspection/Palpation  o : supple  o Thyroid  o : no thyromegaly  · Respiratory  o Respiratory Effort  o : breathing unlabored  o Auscultation of Lungs  o : clear to ascultation  · Cardiovascular  o Heart  o :   § Auscultation of Heart  § : regular rate and rhythm  o Peripheral Vascular System  o :   § Extremities  § : no edema  · Lymphatic  o Neck  o : no lymphadenopathy present  · Musculoskeletal  o General  o :   § General Musculoskeletal  § : (+) crepitus to the bilat knees with ROM and then limited ROM of the shoulder--left and then the bilat neuropathy of the hands and feet   · Skin and Subcutaneous Tissue  o General Inspection  o : skin turgor normal, texture normal  · Neurologic  o Gait and Station  o :   § Gait Screening  § : normal gait  · Psychiatric  o Mood and Affect  o : mood normal, affect appropriate          Assessment  · Osteoarthritis     715.90/M19.90  · Upper respiratory infection     465.9/J06.9  · Otitis media     382.9/H66.90  · Neuropathy     355.9/G62.9      Plan  · Orders  o ACO-17: Screened for tobacco use AND received tobacco cessation intervention (4004F) - - 02/25/2019  o ACO-39: Current medications updated and reviewed () - - " 02/25/2019  · Medications  o azithromycin 250 mg oral tablet   SIG: take 2 tablets (500 mg) by oral route once daily for 1 day then 1 tablet (250 mg) by oral route once daily for 4 days   DISP: (6) tablets with 0 refills  Prescribed on 02/25/2019     o Tessalon Perles 100 mg oral capsule   SIG: take 1 capsule (100 mg) by oral route 3 times per day as needed for cough for 10 days   DISP: (30) capsules with 0 refills  Prescribed on 02/25/2019     · Instructions  o Tylenol may be used as needed every 4-6 hours for pain.  o Take all medications as prescribed/directed.  o Rest. Increase Fluids.  o Patient was educated/instructed on their diagnosis, treatment and medications prior to discharge from the clinic today.  o Patient instructed to seek medical attention urgently for new or worsening symptoms.  o Call the office with any concerns or questions.  o Chronic conditions reviewed and taken into consideration for today's treatment plan.  · Disposition  o Call or Return if symptoms worsen or persist.     filled out the renewal of the handicap placard             Electronically Signed by: MARKY Cardenas -Author on February 25, 2019 12:04:18 PM

## 2021-05-07 NOTE — PROGRESS NOTES
"   Progress Note      Patient Name: Rambo Koehler   Patient ID: [id number]   Sex: Male   YOB: 1955        Visit Date: February 4, 2019    Provider: MARKY Cardenas   Location: [facility]   Location Address: [address] [place] [address]  [id number]   Location Phone: [phone]          Chief Complaint     PATIENT IS HERE FOR A DIABETIC CHECK.            History Of Present Illness  Rambo Koehler is a 63 year old /Black male who presents for evaluation and treatment of:      1 yr ago--    pt sees R Connell for the prostate--sees him next week\">pt her for the DM check today--and needs the form updated so he can take this with him to the podiatrist--they do not allow faxes--and I filled it out last week for him-based on the last exam in August--but then did the addendum to the form for today's exam and had Dr Srinivas Polo sign with me--as well    still a smoker--1/3 PPD--smoked approx. since the 1970's and also quit a few times as well     diet controlled DM--stable  HTN-  neuropathy-sees pain mgt for this  OA--sees pain mgt for this   GERD--stable  thyroid-stable  folic acid deficiency-stable-     alcohol--not drinking since > 1 yr ago--    pt sees R Connell for the prostate--sees him next week       Past Medical History  Disease Name Date Onset Notes   Bunion --  --    Diet-controlled diabetes mellitus --  --    GERD (gastroesophageal reflux disease) --  --    Hammertoe --  --    Hypertension --  --    Hypothyroidism --  --    Neuropathy, peripheral --  --    Prostate Disorder --  --          Past Surgical History  Procedure Name Date Notes   Cataract surgery --  --          Medication List  Name Date Started Instructions   Accu-Chek Nedra Plus Meter miscellaneous misc 08/20/2018 use as directed for 90 days check daily   Accu-Chek Nedra Plus test strp miscellaneous strip 08/20/2018 use as directed check glucose daily   Accu-Chek Softclix Lancets " miscellaneous misc 08/20/2018 use as directed check glucose daily   amlodipine 2.5 mg oral tablet 12/11/2018 TAKE ONE TABLET BY MOUTH DAILY   atenolol 25 mg oral tablet 01/10/2019 TAKE ONE TABLET BY MOUTH TWICE A DAY FOR HIGH BLOOD PRESSURE   baclofen 10 mg oral tablet 12/14/2018 TAKE ONE TABLET BY MOUTH DAILY AS NEEDED FOR MUSCLE SPASMS   Bactrim -160 mg oral tablet 12/18/2018 take 1 tablet by oral route every 12 hours for 10 days   Bactroban 2 % topical cream 12/18/2018 apply a small amount to the affected area by topical route 3 times per day for 7 days   Daily Multi-Vitamins/Iron oral tablet  take 1 tablet by oral route daily   ferrous sulfate 325 mg (65 mg iron) oral tablet  take 1 tablet (325 mg) by oral route once daily   folic acid 1 mg oral tablet 05/24/2018 take 1 tablet (1 mg) by oral route once daily   furosemide 20 mg oral tablet 12/11/2018 TAKE ONE TABLET BY MOUTH DAILY   gabapentin 800 mg oral tablet  take 1 tablet (800 mg) by oral route 3 times per day   hydrochlorothiazide 25 mg oral tablet 05/24/2018 TAKE ONE TABLET BY MOUTH DAILY FOR 30 DAYS   levothyroxine 175 mcg oral tablet 01/28/2019 TAKE ONE TABLET BY MOUTH DAILY   omeprazole 20 mg oral capsule,delayed release(DR/EC) 12/14/2018 TAKE ONE CAPSULE BY MOUTH DAILY 30 MINUTES TO 1 HOUR BEFORE A MEAL   tamsulosin 0.4 mg oral capsule,extended release 24hr 05/24/2018 take 1 capsule (0.4 mg) by oral route once daily 1/2 hour following the same meal each day         Allergy List  Allergen Name Date Reaction Notes   PENICILLINS --  --  --          Social History  Finding Status Start/Stop Quantity Notes   Tobacco Current every day --/-- 5-6 cigs a day --    Uses seatbelts --  --/-- --  yes         Review of Systems  · Constitutional  o Admits  o : fatigue  o Denies  o : fever  · HENT  o Denies  o : vertigo, recent head injury  · Cardiovascular  o Denies  o : chest pain, irregular heart beats  · Respiratory  o Denies  o : shortness of breath,  "productive cough  · Gastrointestinal  o Denies  o : nausea, vomiting, heartburn, blood in stools  · Genitourinary  o Denies  o : dysuria, urinary retention  · Integument  o Denies  o : rash, hair growth change, new skin lesions  · Neurologic  o Admits  o : tingling or numbness  o Denies  o : altered mental status, seizures  · Musculoskeletal  o Admits  o : muscle cramps, shoulder pain, knee pain, foot pain  o Denies  o : joint swelling, limitation of motion  · Endocrine  o Denies  o : cold intolerance, heat intolerance  · Psychiatric  o Denies  o : suicidal ideation, homicidal ideation  · Heme-Lymph  o Denies  o : petechiae, lymph node enlargement or tenderness  · Allergic-Immunologic  o Denies  o : frequent illnesses      Vitals  Date Time BP Position Site L\R Cuff Size HR RR TEMP(F) WT  HT  BMI kg/m2 BSA m2 O2 Sat HC       02/04/2019 08:49 /70 Sitting    78 - R  97.4 247lbs 9oz 5'  11\" 34.53 2.37 97 %           Physical Examination  · Constitutional  o Appearance  o : well developed, well-nourished, in no acute distress  · Head and Face  o HEENT  o : Unremarkable  · Eyes  o Conjunctivae  o : conjunctivae normal  · Neck  o Inspection/Palpation  o : supple  o Thyroid  o : no thyromegaly  · Respiratory  o Respiratory Effort  o : breathing unlabored  o Auscultation of Lungs  o : clear to ascultation  · Cardiovascular  o Heart  o :   § Auscultation of Heart  § : regular rate and rhythm  o Peripheral Vascular System  o :   § Extremities  § : no edema  · Gastrointestinal  o Abdominal Examination  o :   § Abdomen  § : soft nontender  · Lymphatic  o Neck  o : no lymphadenopathy present  · Musculoskeletal  o General  o :   § General Musculoskeletal  § : No joint swelling or deformity., Muscle tone, strength, and development grossly normal.  · Skin and Subcutaneous Tissue  o General Inspection  o : skin turgor normal, texture normal  · Neurologic  o Gait and Station  o :   § Gait Screening  § : normal " gait  · Psychiatric  o Mood and Affect  o : mood normal, affect appropriate  · Left DM Foot Exam  o Sensation  o : abnormal sensory exam perceptible to 10-gram nylon monofilament exam and light touch.--to the bottom of the left foot--but slight sensation tot he top of toes and top of foot  o Visual Inspection  o : visual inspection is normal with no signs of breakdown, ulcerations or deformities unless otherwise noted. (+) hammertoes noted   o Vascular  o : palpable dorsalis pedis and posterir tibialis pulses present, normal capillary refill  · Right DM Foot Exam  o Sensation  o : abnormal sensory exam perceptible to 10-gram nylon monofilament exam and light touch.--to the right foot cannot sense the monofilament touching the bottom nor the top of the foot--and finally has sensation at the lower ankle   o Visual Inspection  o : visual inspection is normal with no signs of breakdown, ulcerations or deformities unless otherwise noted. (+) hammertoes   o Vascular  o : palpable dorsalis pedis and posterir tibialis pulses present, normal capillary refill          Assessment  · Need for influenza vaccination     V04.81/Z23  · Diabetes mellitus, type 2     250.00/E11.9  · Essential hypertension     401.9/I10  · Fatigue     780.79/R53.83  · GERD (gastroesophageal reflux disease)     530.81/K21.9  · Hyperlipidemia     272.4/E78.5  · Hypothyroidism     244.9/E03.9  · Tobacco abuse counseling       Tobacco abuse counseling     V65.42/Z71.6  · Folic acid deficiency     266.2/E53.8  · MAHESH (iron deficiency anemia)     280.9/D50.9  · Diet-controlled diabetes mellitus     250.00/E11.9  · Muscle cramp     729.82/R25.2  · Bone pain     733.90/M89.8X9  · Comprehensive diabetic foot examination, type 2 DM, encounter for     250.00/E11.9      Plan  · Orders  o Immunization Admin Fee (Single) ([facility]) (99317) - V04.81/Z23 - 02/04/2019  o Fluzone High Dose Flu Vaccine (53514) - V04.81/Z23 - 02/04/2019   Vaccine - Influenza; Dose: 0.5; Site:  Right Upper Arm; Route: Intramuscular; Date: 02/04/2019 09:57:00; Exp: 03/30/2019; Lot: [id number]; Mfg: sanofi pasteur; TradeName: Fluzone High-Dose; Administered By: Lisa Canchola MA; Comment: NDC [id number]  o CBC with Auto Diff [facility] (43734) - 250.00/E11.9 - 02/04/2019  o CMP [facility] (01389) - 250.00/E11.9 - 02/04/2019  o Hgb A1c [facility] (31986) - 250.00/E11.9 - 02/04/2019  o Lipid Panel [facility] (96899) - 250.00/E11.9 - 02/04/2019  o Urine microalbumin (26961) - 250.00/E11.9 - 02/04/2019  o Thyroid Profile (THYII) - 250.00/E11.9, 244.9/E03.9 - 02/04/2019  o ACO-27: Most recent 2017 HgbA1c less than 7 [facility] (3044F) - 250.00/E11.9 - 02/04/2019  o Diabetic Foot (Motor and Sensory) Exam Completed [facility] (, , 2028F) - 250.00/E11.9 - 02/04/2019  o Uric Acid (Serum) (33799) - 401.9/I10, 250.00/E11.9 - 02/04/2019  o Smoking cessation counseling, 3-10 minutes [facility] (93416) - V65.42/Z71.6 - 02/04/2019  o ACO-17: Screened for tobacco use AND received tobacco cessation intervention (4004F) - V65.42/Z71.6 - 02/04/2019  o Vitamin D (25-Hydroxy) Level (93517) - 250.00/E11.9, 729.82/R25.2, 733.90/M89.8X9, 780.79/R53.83 - 02/04/2019  o ACO-14: Influenza immunization administered or previously received () - - 02/04/2019   today  o ACO-15: Pneumococcal Vaccine Administered or Previously Received (4040F) - - 02/04/2019  o ACO-19: Colorectal cancer screening results documented and reviewed (3017F) - - 02/04/2019   2017  o ACO-39: Current medications updated and reviewed () - - 02/04/2019  o Iron + iron binding capacity panel ser/plas (62468, 16775) - 280.9/D50.9 - 02/04/2019  o Vitamin B12 and folate measurement (81325, 34598) - 266.2/E53.8, 250.00/E11.9 - 02/04/2019  o Ferritin ser/plas (72426) - 280.9/D50.9 - 02/04/2019  · Medications  o amlodipine 2.5 mg oral tablet   SIG: TAKE ONE TABLET BY MOUTH DAILY   DISP: (90) Tablet with 1 refills  Adjusted on 02/04/2019     o atenolol 25 mg oral tablet   SIG: TAKE ONE TABLET BY MOUTH TWICE A  DAY FOR HIGH BLOOD PRESSURE   DISP: (180) Tablet with 1 refills  Adjusted on 02/04/2019     o baclofen 10 mg oral tablet   SIG: TAKE ONE TABLET BY MOUTH DAILY AS NEEDED FOR MUSCLE SPASMS   DISP: (90) Tablet with 1 refills  Adjusted on 02/04/2019     o Daily Multi-Vitamins/Iron oral tablet   SIG: take 1 tablet by oral route daily   DISP: (90) tablet with 3 refills  Adjusted on 02/04/2019     o folic acid 1 mg oral tablet   SIG: take 1 tablet (1 mg) by oral route once daily   DISP: (90) tablet with 1 refills  Adjusted on 02/04/2019     o furosemide 20 mg oral tablet   SIG: TAKE ONE TABLET BY MOUTH DAILY   DISP: (90) Tablet with 1 refills  Adjusted on 02/04/2019     o levothyroxine 175 mcg oral tablet   SIG: TAKE ONE TABLET BY MOUTH DAILY   DISP: (90) Tablet with 1 refills  Adjusted on 02/04/2019     o omeprazole 20 mg oral capsule,delayed release(DR/EC)   SIG: TAKE ONE CAPSULE BY MOUTH DAILY 30 MINUTES TO 1 HOUR BEFORE A MEAL   DISP: (90) Capsule with 1 refills  Adjusted on 02/04/2019     o tamsulosin 0.4 mg oral capsule   SIG: take 1 capsule (0.4 mg) by oral route once daily 1/2 hour following the same meal each day   DISP: (90) Capsule with 1 refills  Adjusted on 02/04/2019     o Bactrim -160 mg oral tablet   SIG: take 1 tablet by oral route every 12 hours for 10 days   DISP: (20) tablets with 0 refills  Discontinued on 02/04/2019     o Bactroban 2 % topical cream   SIG: apply a small amount to the affected area by topical route 3 times per day for 7 days   DISP: (1) 15 gm tube with 0 refills  Discontinued on 02/04/2019     o hydrochlorothiazide 25 mg oral tablet   SIG: TAKE ONE TABLET BY MOUTH DAILY FOR 30 DAYS   DISP: (30) Tablet with 5 refills  Discontinued on 02/04/2019     · Instructions  o Continue blood sugar monitoring daily and record. Bring your log to office visits. Call the office for readings below 70 and above 250 or any complications.  o Daily foot care. Avoid walking barefoot. Annual Dilated  Eye Exam.  o Discussed with patient blood pressure monitoring, hemoglobin A1C levels need to be below 7.0, and LDL (Lipid) goals below 70.  o Patient advised to monitor blood pressure (B/P) at home and journal readings. Patient informed that a B/P reading at home of more than 135/85 is considered hypertension. For readings greater [id number]/90 or higher patient is advised to follow up in the office with readings for management. Patient advised to limit sodium intake.  o Maintain a healthy weight. Avoid tight fitting clothes. Avoid fried, fatty foods, tomato sauce, chocolate, mint, garlic, onion, alcohol. caffeine. Eat smaller meals, dont lie down after a meal, dont smoke. Elevate the head of your bed 6-9 inches.  o Recommended exercise program to assist with cholesterol, weight loss and overall health improvement.  o Advised that cheeses and other sources of dairy fats, animal fats, fast food, and the extras (candy, pasteries, pies, doughnuts and cookies) all contain LDL raising nutrients. Advised to increase fruits, vegetables, whole grains, and to monitor portion sizes.   o Tobacco and smoking cessation counseling for more than 3 minutes was completed.  o Take all medications as prescribed/directed.  o Patient was educated/instructed on their diagnosis, treatment and medications prior to discharge from the clinic today.  o Patient instructed to seek medical attention urgently for new or worsening symptoms.  o Call the office with any concerns or questions.  o Chronic conditions reviewed and taken into consideration for today's treatment plan.  · Disposition  o Call or Return if symptoms worsen or persist.  o Return Visit Request in/on 6 months +/- 2 days (4657).            Electronically Signed by: MARKY Cardenas -Author on February 4, 2019 10:30:52 AM

## 2021-05-07 NOTE — PROGRESS NOTES
Progress Note      Patient Name: Rambo Koehler   Patient ID: [id number]   Sex: Male   YOB: 1955        Visit Date: March 13, 2019    Provider: MARKY Cardenas   Location: [facility]   Location Address: [address] [place] [address]  [id number]   Location Phone: [phone]          Chief Complaint     right ear pain       History Of Present Illness  Rambo Koehler is a 63 year old /Black male who presents for evaluation and treatment of:      pt was here for the right ear pain that started in the night on 2/24/19 and was seen in the office 2/25/19 and given antibiotics for the OM and URI and was given Zpak and tessalon perles--and while on the antibiotics the pain resolved but the stopped up feeling remained--the URI improved but still a little cough left --not as much phlegm and nasal drainage now clear was yellow and green            Past Medical History  Disease Name Date Onset Notes   Bunion --  --    Diet-controlled diabetes mellitus --  --    GERD (gastroesophageal reflux disease) --  --    Hammertoe --  --    Hypertension --  --    Hypothyroidism --  --    Neuropathy, peripheral --  --    Prostate Disorder --  --          Past Surgical History  Procedure Name Date Notes   Cataract surgery --  --          Medication List  Name Date Started Instructions   Accu-Chek Nedra Plus Meter miscellaneous misc 08/20/2018 use as directed for 90 days check daily   Accu-Chek Nedra Plus test strp miscellaneous strip 08/20/2018 use as directed check glucose daily   Accu-Chek Softclix Lancets miscellaneous misc 08/20/2018 use as directed check glucose daily   amlodipine 2.5 mg oral tablet 02/04/2019 TAKE ONE TABLET BY MOUTH DAILY   atenolol 25 mg oral tablet 02/04/2019 TAKE ONE TABLET BY MOUTH TWICE A DAY FOR HIGH BLOOD PRESSURE   baclofen 10 mg oral tablet 02/04/2019 TAKE ONE TABLET BY MOUTH DAILY AS NEEDED FOR MUSCLE SPASMS   Daily Multi-Vitamins/Iron oral  tablet 02/04/2019 take 1 tablet by oral route daily   ferrous sulfate 325 mg (65 mg iron) oral tablet  take 1 tablet (325 mg) by oral route once daily   folic acid 1 mg oral tablet 02/04/2019 take 1 tablet (1 mg) by oral route once daily   furosemide 20 mg oral tablet 02/04/2019 TAKE ONE TABLET BY MOUTH DAILY   gabapentin 800 mg oral tablet  take 1 tablet (800 mg) by oral route 3 times per day   levothyroxine 175 mcg oral tablet 02/04/2019 TAKE ONE TABLET BY MOUTH DAILY   omeprazole 20 mg oral capsule,delayed release(DR/EC) 02/04/2019 TAKE ONE CAPSULE BY MOUTH DAILY 30 MINUTES TO 1 HOUR BEFORE A MEAL   potassium chloride 10 mEq oral tablet extended release 02/12/2019 take 1 tablet (10 meq) by oral route once daily with food and with daily Lasix   tamsulosin 0.4 mg oral capsule 02/04/2019 take 1 capsule (0.4 mg) by oral route once daily 1/2 hour following the same meal each day   Vitamin D2 50,000 unit oral capsule 02/05/2019 take 1 capsule (50,000 unit) by oral route once weekly for 90 days         Allergy List  Allergen Name Date Reaction Notes   PENICILLINS --  --  --          Social History  Finding Status Start/Stop Quantity Notes   Tobacco Current every day --/-- 5-6 cigs a day --    Uses seatbelts --  --/-- --  yes         Immunizations  NameDate Admin Mfg Trade Name Lot Number Route Inj VIS Given VIS Publication   Boolobyrw60/04/2019 PMC Fluzone High-Dose [id number]  02/04/2019 08/07/2015   Comments: NDC [id number]         Review of Systems  · Constitutional  o Denies  o : fever  · HENT  o Admits  o : nasal discharge, postnasal drip, ear fullness  o Denies  o : sinus pain, nasal congestion, ear pain  · Cardiovascular  o Denies  o : chest pain, palpitations (fast, fluttering, or skipping beats), swelling (feet, ankles, hands), shortness of breath while walking or lying flat  · Respiratory  o Admits  o : cough, dry cough, productive cough  o Denies  o : shortness of breath, wheezing, abnormal sputum  production  · Gastrointestinal  o Denies  o : nausea, vomiting, diarrhea  · Genitourinary  o Denies  o : dysuria  · Integument  o Denies  o : rash  · Heme-Lymph  o Denies  o : bleeding or bruising tendency, anemia  · Allergic-Immunologic  o Denies  o : frequent illnesses      Vitals  Date Time BP Position Site L\R Cuff Size HR RR TEMP(F) WT  HT  BMI kg/m2 BSA m2 O2 Sat        03/13/2019 01:16 /70 Sitting    80 - R  97.8 249lbs 3oz    98 %           Physical Examination  · Constitutional  o Appearance  o : well developed, well-nourished, in no acute distress  · Eyes  o Conjunctivae  o : conjunctivae normal no drainage  o Pupils and Irises  o : pupils equal, round, and reactive to light bilaterally  · Ears, Nose, Mouth and Throat  o Ears  o :   § External Ears  § : no auricle tenderness to palpation present  § Otoscopic Examination  § : left TM normal pearly gray and then the right tM deeply red and (+) fluid   § Hearing  § : intact to conversational voice both ears  o Nose  o :   § External Nose  § : appearance normal  § Nasopharynx  § : slightly red nasal turbinates   o Oral Cavity  o :   § Oral Mucosa  § : oral mucosa normal, breath odor normal  o Throat  o :   § Oropharynx  § : no inflammation or lesions present  · Neck  o Inspection/Palpation  o : supple  o Thyroid  o : no thyromegaly  · Respiratory  o Respiratory Effort  o : breathing unlabored  o Auscultation of Lungs  o : clear to ascultation  · Cardiovascular  o Heart  o :   § Auscultation of Heart  § : regular rate and rhythm  · Musculoskeletal  o General  o :   § General Musculoskeletal  § : No joint swelling or deformity., Muscle tone, strength, and development grossly normal.  · Skin and Subcutaneous Tissue  o General Inspection  o : skin turgor normal, texture normal  · Neurologic  o Mental Status Examination  o :   § Orientation  § : grossly oriented to person, place and time  o Gait and Station  o :   § Gait Screening  § : normal  gait          Assessment  · Otitis media follow-up, not resolved, right     382.9/H66.91      Plan  · Orders  o ACO-39: Current medications updated and reviewed () - - 03/13/2019  · Medications  o Singulair 10 mg oral tablet   SIG: take 1 tablet (10 mg) by oral route once daily in the evening for 14 days   DISP: (14) tablets with 0 refills  Prescribed on 03/13/2019     o ofloxacin 0.3 % otic (ear) drops   SIG: instill 10 drops (1.5 mg) into right ear by otic route 2 times per day for 10 days   DISP: (1) 5 ml drop btl with 0 refills  Prescribed on 03/13/2019     o azithromycin 250 mg oral tablet   SIG: take 2 tablets (500 mg) by oral route once daily for 1 day then 1 tablet (250 mg) by oral route once daily for 4 days   DISP: (6) tablets with 0 refills  Prescribed on 03/13/2019     · Instructions  o Take all medications as prescribed/directed.  o Rest. Increase Fluids.  o Patient was educated/instructed on their diagnosis, treatment and medications prior to discharge from the clinic today.  o Patient instructed to seek medical attention urgently for new or worsening symptoms.  o Call the office with any concerns or questions.  o Chronic conditions reviewed and taken into consideration for today's treatment plan.  · Disposition  o Call or Return if symptoms worsen or persist.            Electronically Signed by: MARKY Cardenas -Author on March 13, 2019 01:40:16 PM

## 2021-05-07 NOTE — PROGRESS NOTES
Progress Note      Patient Name: Rambo Koehler   Patient ID: [id number]   Sex: Male   YOB: 1955        Visit Date: August 20, 2018    Provider: MARKY Cardenas   Location: [facility]   Location Address: [address] [place] [address]  [id number]   Location Phone: [phone]          Chief Complaint     PATIENT IS HERE FOR REFILLS, DIABETIC SHOE EXAM SO HE CAN GET SOME AND HE NEEDS A NEW METER STRIP MACHINE FOR HIS DIABETIC TESTING.       History Of Present Illness  Rambo Koehler is a 63 year old /Black male who presents for evaluation and treatment of:      pt here for the F/U--needs the orders sent and form filled out--with regards to the diabetic shoes--pt used to be on meditation and now is only diet controlled and still suffers from the diabetic neuropathy--and see Pain mgt for this--and is on the gabapentin for this--    and needs a new meter and strips and lancets as well--    machine broke and just reads error     sees the podiatrist in [place] Dr [place] and then gets the form filled out--       Past Medical History  Disease Name Date Onset Notes   Diet-controlled diabetes mellitus --  --    GERD (gastroesophageal reflux disease) --  --    Hypertension --  --    Hypothyroidism --  --    Neuropathy, peripheral --  --    Prostate Disorder --  --          Past Surgical History  Procedure Name Date Notes   Cataract surgery --  --          Medication List  Name Date Started Instructions   Accu-Chek Nedra Plus Meter miscellaneous misc 05/24/2018 use as directed to check glucose daily   Accu-Chek Nedra Plus test strp miscellaneous strip 05/24/2018 use as directed test glucose daily   Accu-Chek Softclix Lancets miscellaneous misc 05/24/2018 use as directed test glucose daily   amlodipine 2.5 mg oral tablet 05/24/2018 take 1 tablet (2.5 mg) by oral route once daily   atenolol 25 mg oral tablet 08/09/2018 TAKE ONE TABLET BY MOUTH TWICE A DAY FOR  HYPERTENSION   baclofen 10 mg oral tablet 05/24/2018 take 1 tablet by oral route QD PRN muscle spasms   Daily Multi-Vitamins/Iron oral tablet  take 1 tablet by oral route daily   ferrous sulfate 325 mg (65 mg iron) oral tablet  take 1 tablet (325 mg) by oral route once daily   folic acid 1 mg oral tablet 05/24/2018 take 1 tablet (1 mg) by oral route once daily   furosemide 20 mg oral tablet 07/25/2018 TAKE ONE TABLET BY MOUTH DAILY   gabapentin 800 mg oral tablet  take 1 tablet (800 mg) by oral route 3 times per day   hydrochlorothiazide 25 mg oral tablet 05/24/2018 TAKE ONE TABLET BY MOUTH DAILY FOR 30 DAYS   levothyroxine 175 mcg oral tablet 05/24/2018 take 1 tablet (175 mcg) by oral route once daily   omeprazole 20 mg oral capsule,delayed release(DR/EC) 05/24/2018 take 1 capsule (20 mg) by oral route once daily 30 minutes to 1 hour before a meal   tamsulosin 0.4 mg oral capsule,extended release 24hr 05/24/2018 take 1 capsule (0.4 mg) by oral route once daily 1/2 hour following the same meal each day         Allergy List  Allergen Name Date Reaction Notes   PENICILLINS --  --  --          Social History  Finding Status Start/Stop Quantity Notes   Tobacco Former --/-- --  --    Uses seatbelts --  --/-- --  yes         Review of Systems  · Constitutional  o Denies  o : fever  · HENT  o Denies  o : vertigo, recent head injury  · Cardiovascular  o Denies  o : chest pain, irregular heart beats  · Respiratory  o Denies  o : shortness of breath, productive cough  · Gastrointestinal  o Denies  o : nausea, vomiting  · Genitourinary  o Denies  o : dysuria, urinary retention  · Neurologic  o Admits  o : tingling or numbness  · Musculoskeletal  o Admits  o : muscle cramps, foot pain, additional musculoskeletal symptoms except as noted in the HPI  o Denies  o : joint swelling, limitation of motion  · Heme-Lymph  o Denies  o : petechiae, lymph node enlargement or tenderness  · Allergic-Immunologic  o Denies  o : frequent  "illnesses      Vitals  Date Time BP Position Site L\R Cuff Size HR RR TEMP(F) WT  HT  BMI kg/m2 BSA m2 O2 Sat HC       08/20/2018 10:09 /78 Sitting    71 - R  96.7 245lbs 5oz 5'  11\" 34.21 2.36 97 %           Physical Examination  · Constitutional  o Appearance  o : well developed, well-nourished, in no acute distress  · Head and Face  o HEENT  o : Unremarkable  · Eyes  o Conjunctivae  o : conjunctivae normal  · Neck  o Inspection/Palpation  o : supple  o Thyroid  o : no thyromegaly  · Respiratory  o Respiratory Effort  o : breathing unlabored  · Cardiovascular  o Peripheral Vascular System  o :   § Extremities  § : no edema  · Gastrointestinal  o Abdominal Examination  o :   § Abdomen  § : soft  · Lymphatic  o Neck  o : no lymphadenopathy present  · Musculoskeletal  o General  o :   § General Musculoskeletal  § : No joint swelling or deformity., Muscle tone, strength, and development grossly normal.  · Skin and Subcutaneous Tissue  o General Inspection  o : no lesions present, no areas of discoloration, skin turgor normal, texture normal  · Neurologic  o Gait and Station  o :   § Gait Screening  § : normal gait  · Psychiatric  o Mood and Affect  o : mood normal, affect appropriate  · Left DM Foot Exam  o Sensation  o : abnormal sensory exam perceptible to 10-gram nylon monofilament exam and light touch--does not feel anything until to the top of the foot   o Visual Inspection  o : visual inspection is normal with no signs of breakdown, ulcerations or deformities unless otherwise noted. --(+) hammertoes and slight bunions   o Vascular  o : palpable dorsalis pedis and posterir tibialis pulses present, normal capillary refill  · Right DM Foot Exam  o Sensation  o : abnormal sensory exam perceptible to 10-gram nylon monofilament exam and light touch.--and does not feel anything until starting up above the ankle   o Visual Inspection  o : visual inspection is normal with no signs of breakdown, ulcerations or " deformities unless otherwise noted. (+) hammertoes and slight bunion   o Vascular  o : palpable dorsalis pedis and posterir tibialis pulses present, normal capillary refill              Assessment  · Diabetes mellitus, type 2     250.00/E11.9  · Diabetic neuropathy       Type 2 diabetes mellitus with diabetic neuropathy, unspecified     250.60/E11.40  · Comprehensive diabetic foot examination, type 2 DM, encounter for     250.00/E11.9      Plan  · Orders  o ACO-39: Current medications updated and reviewed () - - 08/20/2018  o Hgb A1c [facility] ([id number]) - - 08/20/2018  · Medications  o Accu-Chek Nedra Plus Meter miscellaneous misc   SIG: use as directed for 90 days check daily   DISP: (1) Box with 0 refills  Adjusted on 08/20/2018     o Accu-Chek Nedra Plus test strp miscellaneous strip   SIG: use as directed check glucose daily   DISP: (1) 100 ct box with 3 refills  Adjusted on 08/20/2018     o Accu-Chek Softclix Lancets miscellaneous misc   SIG: use as directed check glucose daily   DISP: (1) 100 ct box with 3 refills  Adjusted on 08/20/2018     · Instructions  o Continue blood sugar monitoring daily and record. Bring your log to office visits. Call the office for readings below 70 and above 250 or any complications.  o Daily foot care. Avoid walking barefoot. Annual Dilated Eye Exam.  o Discussed with patient blood pressure monitoring, hemoglobin A1C levels need to be below 7.0, and LDL (Lipid) goals below 70.  o Patient was educated/instructed on their diagnosis, treatment and medications prior to discharge from the clinic today.  o Patient instructed to seek medical attention urgently for new or worsening symptoms.  o Call the office with any concerns or questions.  o Chronic conditions reviewed and taken into consideration for today's treatment plan.  · Disposition  o Call or Return if symptoms worsen or persist.     called the [employer] feet supplier and left message--    but pt actually received the form from FABIAN  Christoph in [place] and needs it filled out and then call pt so he can drop it off to the office and then they will decide on the shoes      checking the A1C as one not done in the new system--and pt did have the elevated glucose on the last labs             Electronically Signed by: MARKY Cardenas -Author on August 20, 2018 12:23:28 PM

## 2021-05-09 VITALS
BODY MASS INDEX: 34.34 KG/M2 | TEMPERATURE: 96.7 F | HEART RATE: 71 BPM | SYSTOLIC BLOOD PRESSURE: 126 MMHG | DIASTOLIC BLOOD PRESSURE: 78 MMHG | HEIGHT: 71 IN | OXYGEN SATURATION: 97 % | WEIGHT: 245.31 LBS

## 2021-05-09 VITALS
DIASTOLIC BLOOD PRESSURE: 72 MMHG | HEIGHT: 71 IN | TEMPERATURE: 97.2 F | OXYGEN SATURATION: 96 % | SYSTOLIC BLOOD PRESSURE: 130 MMHG | WEIGHT: 247.31 LBS | BODY MASS INDEX: 34.62 KG/M2 | HEART RATE: 71 BPM

## 2021-05-09 VITALS
DIASTOLIC BLOOD PRESSURE: 70 MMHG | TEMPERATURE: 97.8 F | SYSTOLIC BLOOD PRESSURE: 130 MMHG | HEART RATE: 80 BPM | OXYGEN SATURATION: 98 % | WEIGHT: 249.19 LBS

## 2021-05-09 VITALS
HEART RATE: 67 BPM | HEIGHT: 71 IN | DIASTOLIC BLOOD PRESSURE: 72 MMHG | BODY MASS INDEX: 35 KG/M2 | TEMPERATURE: 97.2 F | SYSTOLIC BLOOD PRESSURE: 120 MMHG | WEIGHT: 250 LBS | OXYGEN SATURATION: 96 %

## 2021-05-09 VITALS
BODY MASS INDEX: 35.56 KG/M2 | DIASTOLIC BLOOD PRESSURE: 70 MMHG | TEMPERATURE: 97.6 F | HEIGHT: 71 IN | HEART RATE: 80 BPM | WEIGHT: 254 LBS | SYSTOLIC BLOOD PRESSURE: 122 MMHG | OXYGEN SATURATION: 98 %

## 2021-05-09 VITALS
BODY MASS INDEX: 34.66 KG/M2 | DIASTOLIC BLOOD PRESSURE: 70 MMHG | OXYGEN SATURATION: 97 % | HEART RATE: 78 BPM | WEIGHT: 247.56 LBS | TEMPERATURE: 97.4 F | SYSTOLIC BLOOD PRESSURE: 118 MMHG | HEIGHT: 71 IN

## 2021-05-09 VITALS
WEIGHT: 243.5 LBS | SYSTOLIC BLOOD PRESSURE: 132 MMHG | HEART RATE: 83 BPM | TEMPERATURE: 98 F | OXYGEN SATURATION: 97 % | HEIGHT: 71 IN | DIASTOLIC BLOOD PRESSURE: 78 MMHG | BODY MASS INDEX: 34.09 KG/M2

## 2021-05-09 VITALS
BODY MASS INDEX: 33.09 KG/M2 | HEART RATE: 83 BPM | WEIGHT: 236.37 LBS | OXYGEN SATURATION: 98 % | SYSTOLIC BLOOD PRESSURE: 142 MMHG | TEMPERATURE: 97.1 F | DIASTOLIC BLOOD PRESSURE: 90 MMHG | HEIGHT: 71 IN

## 2021-05-15 VITALS
BODY MASS INDEX: 34.87 KG/M2 | HEIGHT: 71 IN | SYSTOLIC BLOOD PRESSURE: 117 MMHG | DIASTOLIC BLOOD PRESSURE: 63 MMHG | HEART RATE: 63 BPM

## 2021-05-16 VITALS
WEIGHT: 243 LBS | DIASTOLIC BLOOD PRESSURE: 72 MMHG | TEMPERATURE: 99 F | HEART RATE: 59 BPM | HEIGHT: 71 IN | SYSTOLIC BLOOD PRESSURE: 133 MMHG | BODY MASS INDEX: 34.02 KG/M2

## 2021-05-16 VITALS
WEIGHT: 240 LBS | HEART RATE: 59 BPM | HEIGHT: 71 IN | DIASTOLIC BLOOD PRESSURE: 58 MMHG | BODY MASS INDEX: 33.6 KG/M2 | TEMPERATURE: 98.6 F | SYSTOLIC BLOOD PRESSURE: 104 MMHG

## 2021-05-16 VITALS
HEART RATE: 62 BPM | BODY MASS INDEX: 33.6 KG/M2 | SYSTOLIC BLOOD PRESSURE: 130 MMHG | TEMPERATURE: 99 F | HEIGHT: 71 IN | WEIGHT: 240 LBS | DIASTOLIC BLOOD PRESSURE: 66 MMHG